# Patient Record
Sex: MALE | Race: WHITE | Employment: OTHER | URBAN - METROPOLITAN AREA
[De-identification: names, ages, dates, MRNs, and addresses within clinical notes are randomized per-mention and may not be internally consistent; named-entity substitution may affect disease eponyms.]

---

## 2018-05-16 ENCOUNTER — HOSPITAL ENCOUNTER (EMERGENCY)
Age: 78
Discharge: OTHER INSTITUTION | End: 2018-05-17
Attending: EMERGENCY MEDICINE
Payer: MEDICARE

## 2018-05-16 DIAGNOSIS — K52.9 GASTROENTERITIS: ICD-10-CM

## 2018-05-16 DIAGNOSIS — E86.0 DEHYDRATION: Primary | ICD-10-CM

## 2018-05-16 DIAGNOSIS — N18.9 CHRONIC RENAL FAILURE, UNSPECIFIED CKD STAGE: ICD-10-CM

## 2018-05-16 LAB
ALBUMIN SERPL-MCNC: 4 G/DL (ref 3.9–4.9)
ALP BLD-CCNC: 100 U/L (ref 35–104)
ALT SERPL-CCNC: 11 U/L (ref 0–41)
AMYLASE: 91 U/L (ref 28–100)
ANION GAP SERPL CALCULATED.3IONS-SCNC: 17 MEQ/L (ref 7–13)
AST SERPL-CCNC: 21 U/L (ref 0–40)
BASOPHILS ABSOLUTE: 0 K/UL (ref 0–0.2)
BASOPHILS RELATIVE PERCENT: 0.2 %
BILIRUB SERPL-MCNC: 0.8 MG/DL (ref 0–1.2)
BUN BLDV-MCNC: 38 MG/DL (ref 8–23)
CALCIUM SERPL-MCNC: 8.7 MG/DL (ref 8.6–10.2)
CHLORIDE BLD-SCNC: 94 MEQ/L (ref 98–107)
CO2: 28 MEQ/L (ref 22–29)
EOSINOPHILS ABSOLUTE: 0.1 K/UL (ref 0–0.7)
EOSINOPHILS RELATIVE PERCENT: 0.9 %
GLOBULIN: 4.5 G/DL (ref 2.3–3.5)
GLUCOSE BLD-MCNC: 142 MG/DL (ref 74–109)
HCT VFR BLD CALC: 29.5 % (ref 42–52)
HEMOGLOBIN: 10.3 G/DL (ref 14–18)
LACTIC ACID: 2.8 MMOL/L (ref 0.5–2.2)
LIPASE: 30 U/L (ref 13–60)
LYMPHOCYTES ABSOLUTE: 1 K/UL (ref 1–4.8)
LYMPHOCYTES RELATIVE PERCENT: 16 %
MCH RBC QN AUTO: 33 PG (ref 27–31.3)
MCHC RBC AUTO-ENTMCNC: 34.8 % (ref 33–37)
MCV RBC AUTO: 94.9 FL (ref 80–100)
MONOCYTES ABSOLUTE: 0.2 K/UL (ref 0.2–0.8)
MONOCYTES RELATIVE PERCENT: 3.8 %
NEUTROPHILS ABSOLUTE: 5 K/UL (ref 1.4–6.5)
NEUTROPHILS RELATIVE PERCENT: 79.1 %
PDW BLD-RTO: 18.2 % (ref 11.5–14.5)
PLATELET # BLD: 91 K/UL (ref 130–400)
POTASSIUM SERPL-SCNC: 4.7 MEQ/L (ref 3.5–5.1)
RBC # BLD: 3.11 M/UL (ref 4.7–6.1)
SODIUM BLD-SCNC: 139 MEQ/L (ref 132–144)
TOTAL PROTEIN: 8.5 G/DL (ref 6.4–8.1)
WBC # BLD: 6.4 K/UL (ref 4.8–10.8)

## 2018-05-16 PROCEDURE — 6360000002 HC RX W HCPCS: Performed by: EMERGENCY MEDICINE

## 2018-05-16 PROCEDURE — 83605 ASSAY OF LACTIC ACID: CPT

## 2018-05-16 PROCEDURE — 6370000000 HC RX 637 (ALT 250 FOR IP): Performed by: EMERGENCY MEDICINE

## 2018-05-16 PROCEDURE — 96375 TX/PRO/DX INJ NEW DRUG ADDON: CPT

## 2018-05-16 PROCEDURE — 80053 COMPREHEN METABOLIC PANEL: CPT

## 2018-05-16 PROCEDURE — 82150 ASSAY OF AMYLASE: CPT

## 2018-05-16 PROCEDURE — 85025 COMPLETE CBC W/AUTO DIFF WBC: CPT

## 2018-05-16 PROCEDURE — 96374 THER/PROPH/DIAG INJ IV PUSH: CPT

## 2018-05-16 PROCEDURE — 83690 ASSAY OF LIPASE: CPT

## 2018-05-16 PROCEDURE — 2580000003 HC RX 258: Performed by: EMERGENCY MEDICINE

## 2018-05-16 PROCEDURE — 99285 EMERGENCY DEPT VISIT HI MDM: CPT

## 2018-05-16 PROCEDURE — 87040 BLOOD CULTURE FOR BACTERIA: CPT

## 2018-05-16 PROCEDURE — 36415 COLL VENOUS BLD VENIPUNCTURE: CPT

## 2018-05-16 RX ORDER — METOCLOPRAMIDE HYDROCHLORIDE 5 MG/ML
10 INJECTION INTRAMUSCULAR; INTRAVENOUS ONCE
Status: COMPLETED | OUTPATIENT
Start: 2018-05-16 | End: 2018-05-16

## 2018-05-16 RX ORDER — ACETAMINOPHEN 500 MG
1000 TABLET ORAL ONCE
Status: COMPLETED | OUTPATIENT
Start: 2018-05-16 | End: 2018-05-16

## 2018-05-16 RX ORDER — SODIUM CHLORIDE 0.9 % (FLUSH) 0.9 %
3 SYRINGE (ML) INJECTION EVERY 8 HOURS
Status: DISCONTINUED | OUTPATIENT
Start: 2018-05-16 | End: 2018-05-17 | Stop reason: HOSPADM

## 2018-05-16 RX ORDER — DIPHENHYDRAMINE HYDROCHLORIDE 50 MG/ML
25 INJECTION INTRAMUSCULAR; INTRAVENOUS ONCE
Status: COMPLETED | OUTPATIENT
Start: 2018-05-16 | End: 2018-05-16

## 2018-05-16 RX ORDER — SODIUM CHLORIDE 9 MG/ML
INJECTION, SOLUTION INTRAVENOUS CONTINUOUS
Status: DISCONTINUED | OUTPATIENT
Start: 2018-05-16 | End: 2018-05-17 | Stop reason: HOSPADM

## 2018-05-16 RX ORDER — ONDANSETRON 2 MG/ML
4 INJECTION INTRAMUSCULAR; INTRAVENOUS ONCE
Status: COMPLETED | OUTPATIENT
Start: 2018-05-16 | End: 2018-05-16

## 2018-05-16 RX ADMIN — METOCLOPRAMIDE 10 MG: 5 INJECTION, SOLUTION INTRAMUSCULAR; INTRAVENOUS at 23:27

## 2018-05-16 RX ADMIN — Medication 3 ML: at 22:51

## 2018-05-16 RX ADMIN — ACETAMINOPHEN 1000 MG: 500 TABLET ORAL at 22:50

## 2018-05-16 RX ADMIN — ONDANSETRON 4 MG: 2 INJECTION INTRAMUSCULAR; INTRAVENOUS at 22:50

## 2018-05-16 RX ADMIN — DIPHENHYDRAMINE HYDROCHLORIDE 25 MG: 50 INJECTION INTRAMUSCULAR; INTRAVENOUS at 23:27

## 2018-05-16 ASSESSMENT — ENCOUNTER SYMPTOMS
FACIAL SWELLING: 0
VOICE CHANGE: 0
CHEST TIGHTNESS: 0
WHEEZING: 0
COUGH: 0
CONSTIPATION: 0
TROUBLE SWALLOWING: 0
SHORTNESS OF BREATH: 0
ABDOMINAL PAIN: 0
STRIDOR: 0
NAUSEA: 1
BACK PAIN: 0
EYE PAIN: 0
CHOKING: 0
EYE REDNESS: 0
EYE DISCHARGE: 0
SORE THROAT: 0
BLOOD IN STOOL: 0
DIARRHEA: 1
SINUS PRESSURE: 0
VOMITING: 0

## 2018-05-16 ASSESSMENT — PAIN SCALES - GENERAL: PAINLEVEL_OUTOF10: 0

## 2018-05-17 VITALS
RESPIRATION RATE: 16 BRPM | DIASTOLIC BLOOD PRESSURE: 62 MMHG | HEART RATE: 98 BPM | SYSTOLIC BLOOD PRESSURE: 140 MMHG | WEIGHT: 205 LBS | TEMPERATURE: 100.8 F | HEIGHT: 66 IN | OXYGEN SATURATION: 98 % | BODY MASS INDEX: 32.95 KG/M2

## 2018-05-17 PROBLEM — Z86.19 HISTORY OF CMV: Status: ACTIVE | Noted: 2018-01-21

## 2018-05-17 PROBLEM — N18.6 ESRD (END STAGE RENAL DISEASE) (HCC): Status: ACTIVE | Noted: 2018-03-22

## 2018-05-17 PROBLEM — Z51.81 ANTICOAGULATION MANAGEMENT ENCOUNTER: Status: ACTIVE | Noted: 2018-02-15

## 2018-05-17 PROBLEM — Z86.19 HISTORY OF VIRAL INFECTION: Status: ACTIVE | Noted: 2018-01-21

## 2018-05-17 PROBLEM — K52.9 COLITIS: Status: ACTIVE | Noted: 2018-01-21

## 2018-05-17 PROBLEM — I48.19 ATRIAL FIBRILLATION, PERSISTENT (HCC): Status: ACTIVE | Noted: 2018-02-18

## 2018-05-17 PROBLEM — I63.9 ISCHEMIC STROKE (HCC): Status: ACTIVE | Noted: 2018-02-17

## 2018-05-17 PROBLEM — Z92.25 PERSONAL HISTORY OF IMMUNOSUPRESSION THERAPY: Status: ACTIVE | Noted: 2018-01-21

## 2018-05-17 PROBLEM — Z79.01 ANTICOAGULATION MANAGEMENT ENCOUNTER: Status: ACTIVE | Noted: 2018-02-15

## 2018-05-17 RX ORDER — KETOTIFEN FUMARATE 0.35 MG/ML
SOLUTION/ DROPS OPHTHALMIC
COMMUNITY
Start: 2018-02-15

## 2018-05-17 RX ORDER — LIDOCAINE HCL 4% 4 G/100G
CREAM TOPICAL
COMMUNITY
Start: 2018-04-19

## 2018-05-17 RX ORDER — FOLIC ACID/VIT B COMPLEX AND C 0.8 MG
1 TABLET ORAL
COMMUNITY
Start: 2018-02-16

## 2018-05-17 RX ORDER — PSEUDOEPHEDRINE HCL 30 MG
100 TABLET ORAL
COMMUNITY
Start: 2018-02-15

## 2018-05-17 RX ORDER — GENTAMICIN SULFATE 1 MG/G
CREAM TOPICAL
COMMUNITY
Start: 2018-02-15

## 2018-05-17 RX ORDER — SEVELAMER CARBONATE 800 MG/1
800 TABLET, FILM COATED ORAL
COMMUNITY
Start: 2018-02-15

## 2018-05-17 RX ORDER — BISACODYL 10 MG
10 SUPPOSITORY, RECTAL RECTAL
COMMUNITY
Start: 2018-02-15

## 2018-05-17 RX ORDER — DEXTROSE MONOHYDRATE 25 G/50ML
25 INJECTION, SOLUTION INTRAVENOUS
COMMUNITY
Start: 2018-02-15

## 2018-05-17 RX ORDER — TACROLIMUS 0.5 MG/1
1.5 CAPSULE ORAL
COMMUNITY
Start: 2018-02-22 | End: 2019-02-22

## 2018-05-17 RX ORDER — METOPROLOL SUCCINATE 25 MG/1
50 TABLET, EXTENDED RELEASE ORAL
COMMUNITY

## 2018-05-17 RX ORDER — METOPROLOL SUCCINATE 25 MG/1
25 TABLET, EXTENDED RELEASE ORAL
COMMUNITY
Start: 2018-03-30

## 2018-05-17 RX ORDER — SULFAMETHOXAZOLE AND TRIMETHOPRIM 400; 80 MG/1; MG/1
1 TABLET ORAL
COMMUNITY
Start: 2018-02-23 | End: 2018-08-22

## 2018-05-17 RX ORDER — DEXTROSE AND SODIUM CHLORIDE 5; .45 G/100ML; G/100ML
50 INJECTION, SOLUTION INTRAVENOUS
COMMUNITY
Start: 2018-02-15

## 2018-05-17 RX ORDER — ATORVASTATIN CALCIUM 10 MG/1
10 TABLET, FILM COATED ORAL
COMMUNITY

## 2018-05-17 RX ORDER — NICOTINE POLACRILEX 4 MG
15 LOZENGE BUCCAL
COMMUNITY
Start: 2018-02-15

## 2018-05-17 RX ORDER — ACETAMINOPHEN 325 MG/1
650 TABLET ORAL
COMMUNITY
Start: 2018-02-15

## 2018-05-17 RX ORDER — MENTHOL AND ZINC OXIDE .44; 20.625 G/100G; G/100G
OINTMENT TOPICAL
COMMUNITY
Start: 2018-02-15

## 2018-05-17 RX ORDER — SULFAMETHOXAZOLE AND TRIMETHOPRIM 800; 160 MG/1; MG/1
TABLET ORAL
COMMUNITY

## 2018-05-17 RX ORDER — LIDOCAINE 50 MG/G
PATCH TOPICAL
COMMUNITY
Start: 2018-04-19

## 2018-05-17 ASSESSMENT — ENCOUNTER SYMPTOMS
VOMITING: 0
TROUBLE SWALLOWING: 0
STRIDOR: 0
EYE PAIN: 0
CHOKING: 0
NAUSEA: 1
EYE DISCHARGE: 0
EYE REDNESS: 0
SORE THROAT: 0
VOICE CHANGE: 0
WHEEZING: 0
ABDOMINAL PAIN: 0
COUGH: 0
CONSTIPATION: 0
BACK PAIN: 0
CHEST TIGHTNESS: 0
FACIAL SWELLING: 0
SHORTNESS OF BREATH: 0
DIARRHEA: 1
BLOOD IN STOOL: 0
SINUS PRESSURE: 0

## 2018-05-19 LAB
BLOOD CULTURE, ROUTINE: ABNORMAL
BLOOD CULTURE, ROUTINE: ABNORMAL
ORGANISM: ABNORMAL

## 2018-05-22 LAB — CULTURE, BLOOD 2: NORMAL

## 2022-11-14 ENCOUNTER — HOSPITAL ENCOUNTER (EMERGENCY)
Age: 82
Discharge: HOME | End: 2022-11-14
Attending: EMERGENCY MEDICINE
Payer: MEDICARE

## 2022-11-14 VITALS
BODY MASS INDEX: 27.97 KG/M2 | SYSTOLIC BLOOD PRESSURE: 107 MMHG | DIASTOLIC BLOOD PRESSURE: 59 MMHG | HEART RATE: 89 BPM | WEIGHT: 174 LBS | OXYGEN SATURATION: 98 % | HEIGHT: 66 IN | RESPIRATION RATE: 20 BRPM | TEMPERATURE: 97.7 F

## 2022-11-14 DIAGNOSIS — T82.838A BLEEDING FROM DIALYSIS SHUNT, INITIAL ENCOUNTER (HCC): Primary | ICD-10-CM

## 2022-11-14 PROCEDURE — 99282 EMERGENCY DEPT VISIT SF MDM: CPT

## 2022-11-14 RX ORDER — MIDODRINE HYDROCHLORIDE 5 MG/1
15 TABLET ORAL 3 TIMES DAILY
COMMUNITY

## 2022-11-14 ASSESSMENT — PAIN - FUNCTIONAL ASSESSMENT: PAIN_FUNCTIONAL_ASSESSMENT: NONE - DENIES PAIN

## 2022-11-15 NOTE — ED NOTES
Pt bleeding at site has resolved after Dr. Audi Hodges used dermabond. Pt's fistula dressed and states he is comfortable with discharge.      Farhat Rodriguez RN  11/14/22 1943

## 2022-11-15 NOTE — ED PROVIDER NOTES
[facility] ED  EMERGENCY DEPARTMENT ENCOUNTER      Pt Name: Mara Banks  MRN: [id number]  [place] 1940  Date of evaluation: 11/14/2022  Provider: Miguel Garcia DO    CHIEF COMPLAINT       Chief Complaint   Patient presents with    Vascular Access Problem     Bleeding at left upper arm fistula site since dialysis today     Chief complaint: Bleeding from my dialysis site  History of chief complaint: This 56-year-old gentleman presents the emergency department complaining of oozing blood from his dialysis site that began shortly after dialysis this afternoon. Patient states he has had a little oozing after dialysis before can usually get it controlled with pressure but continues to ooze today. Denies any use of blood thinners. Patient states he is otherwise been well denies any numb tingling weakness to the extremity no injury or trauma. No other complaint. Nursing Notes were reviewed. REVIEW OF SYSTEMS    (2-9 systems for level 4, 10 or more for level 5)     Review of Systems  Pertinent findings documented in the history of present illness  Except as noted above the remainder of the review of systems was reviewed and negative.        PAST MEDICAL HISTORY     Past Medical History:   Diagnosis Date    Renal failure          SURGICAL HISTORY       Past Surgical History:   Procedure Laterality Date    LIVER TRANSPLANT           CURRENT MEDICATIONS       Previous Medications    ACETAMINOPHEN (TYLENOL) 325 MG TABLET    Take 650 mg by mouth    APAP-CALCIUM CARBONATE 500-250 MG TABS    Take 500 mg by mouth    ATORVASTATIN (LIPITOR) 10 MG TABLET    Take 10 mg by mouth    B COMPLEX-C-FOLIC ACID (ERICA-FILIPPO) TABS    Take 1 each by mouth    BISACODYL (DULCOLAX) 10 MG SUPPOSITORY    Place 10 mg rectally    DARBEPOETIN ELMER-POLYSORBATE (ARANESP) 100 MCG/0.5ML SOSY INJECTION    Inject 100 mcg into the skin    DEXTROSE 50 % SOLUTION    Infuse 25 g intravenously    DEXTROSE-SODIUM CHLORIDE (DEXTROSE 5 % AND 0.45 % NACL) 5-0.45 % INFUSION    Infuse 50 mL/hr intravenously    DILTIAZEM (CARDIZEM) 30 MG TABLET    Take 30 mg by mouth    DOCUSATE (COLACE, DULCOLAX) 100 MG CAPS    Take 100 mg by mouth    GENTAMICIN (GARAMYCIN) 0.1 % CREAM    Apply topically    GLUCAGON 1 MG INJECTION    Inject 1 mg into the muscle    GLUCOSE (GLUTOSE) 40 % GEL    Take 15 g by mouth    KETOTIFEN (ZADITOR) 0.025 % OPHTHALMIC SOLUTION    Administer 1 drop into the left eye 2 (two) times a day. LIDOCAINE (LIDODERM) 5 %    Apply patch to affected area as needed (12 hr on and 12h off)    LIDOCAINE HCL 4 % CREA    Apply to affected area as needed at night time, may use up to 3 times a day    MENTHOL-ZINC OXIDE (CALMOSEPTINE) 0.44-20.625 % OINT OINTMENT    Apply topically    METOPROLOL SUCCINATE (TOPROL XL) 25 MG EXTENDED RELEASE TABLET    Take 50 mg by mouth    METOPROLOL SUCCINATE (TOPROL XL) 25 MG EXTENDED RELEASE TABLET    Take 25 mg by mouth    MIDODRINE (PROAMATINE) 5 MG TABLET    Take 15 mg by mouth 3 times daily    SEVELAMER (RENVELA) 800 MG TABLET    Take 800 mg by mouth    SULFAMETHOXAZOLE-TRIMETHOPRIM (BACTRIM DS;SEPTRA DS) 800-160 MG PER TABLET    Take by mouth    TACROLIMUS (PROGRAF) 0.5 MG CAPSULE    Take 1.5 mg by mouth       ALLERGIES     Latex and Penicillins    FAMILY HISTORY     History reviewed. No pertinent family history.        SOCIAL HISTORY       Social History     Socioeconomic History    Marital status:      Spouse name: None    Number of children: None    Years of education: None    Highest education level: None   Tobacco Use    Smoking status: Never     Passive exposure: Never    Smokeless tobacco: Never   Vaping Use    Vaping Use: Never used   Substance and Sexual Activity    Alcohol use: Never    Drug use: Never    Sexual activity: Not Currently     Partners: Female         PHYSICAL EXAM    (up to 7 for level 4, 8 or more for level 5)     ED Triage Vitals   BP Temp Temp Source Heart Rate Resp SpO2 Height Weight 11/14/22 1801 11/14/22 1747 11/14/22 1747 11/14/22 1747 11/14/22 1747 11/14/22 1747 11/14/22 1747 11/14/22 1747   (!) 114/58 97.7 °F (36.5 °C) Oral 92 20 92 % 5' 6\" (1.676 m) 174 lb (78.9 kg)       Physical Exam  General appearance: Patient is awake alert interactive appropriate nontoxic in no distress, pleasant and joking  Eyes pupils are equal and reactive sclera white conjunctive are pink   oral pharyngeal cavity is pink with good moisture  Heart is regular rate and rhythm with intermittent ectopy  Lungs clear to auscultation no active wheezes rales or rhonchi. No respiratory distress. Pulse ox 100% on 1 L home O2  Left arm: There is a puncture site overlying the left arm fistula with point of oozing clears with dabbing with a 4 x 4. There is a good palpable pulse no erythema warmth or discoloration to the extremity. EMERGENCY DEPARTMENT COURSE and DIFFERENTIAL DIAGNOSIS/MDM:   Vitals:    Vitals:    11/14/22 1747 11/14/22 1801 11/14/22 1930 11/14/22 1931   BP:  (!) 114/58 (!) 107/59    Pulse: 92 89     Resp: 20 20     Temp: 97.7 °F (36.5 °C)      TempSrc: Oral      SpO2: 92% 92% 100% 100%   Weight: 174 lb (78.9 kg)      Height: 5' 6\" (1.676 m)        Treatment and course: Wound care. Area was wrapped with a pressure dressing prior to arrival there was a small persistent ooze from the area on examination. The area was cleansed and skin adhesive was used with resolution of bleeding. Dressing was applied    FINAL IMPRESSION      1. Bleeding from dialysis shunt, initial encounter [facility])          DISPOSITION/PLAN   DISPOSITION    Patient discharged home advised to leave the dressing in place this evening. May remove tomorrow for recheck. Patient to return if any change or worsening any recurrent bleeding pain discoloration.   Patient to follow-up with primary physician/dialysis physician for repeat assessment in the next 2 days    PATIENT REFERRED TO:  Your doctor  for site recheck  In 2 days      DISCHARGE MEDICATIONS:  New Prescriptions    No medications on file     Controlled Substances Monitoring:     No flowsheet data found.     (Please note that portions of this note were completed with a voice recognition program.  Efforts were made to edit the dictations but occasionally words are mis-transcribed.)    Zhanna Navarrete DO (electronically signed)  Attending Emergency Physician            Zhanna Navarrete DO  11/14/22 [id number]

## 2023-12-01 ENCOUNTER — HOSPITAL ENCOUNTER (EMERGENCY)
Facility: HOSPITAL | Age: 83
Discharge: HOME | End: 2023-12-01
Attending: STUDENT IN AN ORGANIZED HEALTH CARE EDUCATION/TRAINING PROGRAM
Payer: MEDICARE

## 2023-12-01 VITALS
BODY MASS INDEX: 25.23 KG/M2 | OXYGEN SATURATION: 100 % | SYSTOLIC BLOOD PRESSURE: 139 MMHG | WEIGHT: 157 LBS | HEART RATE: 82 BPM | RESPIRATION RATE: 18 BRPM | DIASTOLIC BLOOD PRESSURE: 70 MMHG | HEIGHT: 66 IN | TEMPERATURE: 98.2 F

## 2023-12-01 DIAGNOSIS — T82.9XXA DIALYSIS COMPLICATION, INITIAL ENCOUNTER: Primary | ICD-10-CM

## 2023-12-01 LAB
ALBUMIN SERPL BCP-MCNC: 4 G/DL (ref 3.4–5)
ALP SERPL-CCNC: 94 U/L (ref 33–136)
ALT SERPL W P-5'-P-CCNC: 17 U/L (ref 10–52)
ANION GAP SERPL CALC-SCNC: 12 MMOL/L (ref 10–20)
AST SERPL W P-5'-P-CCNC: 31 U/L (ref 9–39)
BASOPHILS # BLD MANUAL: 0 X10*3/UL (ref 0–0.1)
BASOPHILS NFR BLD MANUAL: 0 %
BILIRUB SERPL-MCNC: 1.8 MG/DL (ref 0–1.2)
BUN SERPL-MCNC: 15 MG/DL (ref 6–23)
CALCIUM SERPL-MCNC: 9.4 MG/DL (ref 8.6–10.3)
CHLORIDE SERPL-SCNC: 95 MMOL/L (ref 98–107)
CO2 SERPL-SCNC: 33 MMOL/L (ref 21–32)
CREAT SERPL-MCNC: 3.1 MG/DL (ref 0.5–1.3)
EOSINOPHIL # BLD MANUAL: 0 X10*3/UL (ref 0–0.4)
EOSINOPHIL NFR BLD MANUAL: 0 %
ERYTHROCYTE [DISTWIDTH] IN BLOOD BY AUTOMATED COUNT: 17.5 % (ref 11.5–14.5)
GFR SERPL CREATININE-BSD FRML MDRD: 19 ML/MIN/1.73M*2
GLUCOSE SERPL-MCNC: 138 MG/DL (ref 74–99)
HCT VFR BLD AUTO: 25.9 % (ref 41–52)
HGB BLD-MCNC: 8 G/DL (ref 13.5–17.5)
HYPOCHROMIA BLD QL SMEAR: ABNORMAL
IMM GRANULOCYTES # BLD AUTO: 0.05 X10*3/UL (ref 0–0.5)
IMM GRANULOCYTES NFR BLD AUTO: 1.4 % (ref 0–0.9)
LYMPHOCYTES # BLD MANUAL: 0.7 X10*3/UL (ref 0.8–3)
LYMPHOCYTES NFR BLD MANUAL: 20 %
MCH RBC QN AUTO: 28.5 PG (ref 26–34)
MCHC RBC AUTO-ENTMCNC: 30.9 G/DL (ref 32–36)
MCV RBC AUTO: 92 FL (ref 80–100)
METAMYELOCYTES # BLD MANUAL: 0.07 X10*3/UL
METAMYELOCYTES NFR BLD MANUAL: 2 %
MONOCYTES # BLD MANUAL: 0.25 X10*3/UL (ref 0.05–0.8)
MONOCYTES NFR BLD MANUAL: 7 %
NEUTROPHILS # BLD MANUAL: 2.49 X10*3/UL (ref 1.6–5.5)
NEUTS BAND # BLD MANUAL: 0.07 X10*3/UL (ref 0–0.5)
NEUTS BAND NFR BLD MANUAL: 2 %
NEUTS SEG # BLD MANUAL: 2.42 X10*3/UL (ref 1.6–5)
NEUTS SEG NFR BLD MANUAL: 69 %
NRBC BLD-RTO: 0.6 /100 WBCS (ref 0–0)
PLATELET # BLD AUTO: 68 X10*3/UL (ref 150–450)
POLYCHROMASIA BLD QL SMEAR: ABNORMAL
POTASSIUM SERPL-SCNC: 3.5 MMOL/L (ref 3.5–5.3)
PROT SERPL-MCNC: 10.1 G/DL (ref 6.4–8.2)
RBC # BLD AUTO: 2.81 X10*6/UL (ref 4.5–5.9)
RBC MORPH BLD: ABNORMAL
SODIUM SERPL-SCNC: 136 MMOL/L (ref 136–145)
TOTAL CELLS COUNTED BLD: 100
WBC # BLD AUTO: 3.5 X10*3/UL (ref 4.4–11.3)

## 2023-12-01 PROCEDURE — 99283 EMERGENCY DEPT VISIT LOW MDM: CPT

## 2023-12-01 PROCEDURE — 80053 COMPREHEN METABOLIC PANEL: CPT | Performed by: STUDENT IN AN ORGANIZED HEALTH CARE EDUCATION/TRAINING PROGRAM

## 2023-12-01 PROCEDURE — 85027 COMPLETE CBC AUTOMATED: CPT | Performed by: STUDENT IN AN ORGANIZED HEALTH CARE EDUCATION/TRAINING PROGRAM

## 2023-12-01 PROCEDURE — 85007 BL SMEAR W/DIFF WBC COUNT: CPT | Performed by: STUDENT IN AN ORGANIZED HEALTH CARE EDUCATION/TRAINING PROGRAM

## 2023-12-01 PROCEDURE — 99284 EMERGENCY DEPT VISIT MOD MDM: CPT | Performed by: STUDENT IN AN ORGANIZED HEALTH CARE EDUCATION/TRAINING PROGRAM

## 2023-12-01 PROCEDURE — 2500000005 HC RX 250 GENERAL PHARMACY W/O HCPCS: Performed by: STUDENT IN AN ORGANIZED HEALTH CARE EDUCATION/TRAINING PROGRAM

## 2023-12-01 PROCEDURE — 36415 COLL VENOUS BLD VENIPUNCTURE: CPT | Performed by: STUDENT IN AN ORGANIZED HEALTH CARE EDUCATION/TRAINING PROGRAM

## 2023-12-01 PROCEDURE — 94760 N-INVAS EAR/PLS OXIMETRY 1: CPT

## 2023-12-01 RX ORDER — TRANEXAMIC ACID 100 MG/ML
500 INJECTION, SOLUTION INTRAVENOUS ONCE
Status: COMPLETED | OUTPATIENT
Start: 2023-12-01 | End: 2023-12-01

## 2023-12-01 RX ADMIN — TRANEXAMIC ACID 500 MG: 100 INJECTION, SOLUTION INTRAVENOUS at 19:06

## 2023-12-01 ASSESSMENT — COLUMBIA-SUICIDE SEVERITY RATING SCALE - C-SSRS
2. HAVE YOU ACTUALLY HAD ANY THOUGHTS OF KILLING YOURSELF?: NO
1. IN THE PAST MONTH, HAVE YOU WISHED YOU WERE DEAD OR WISHED YOU COULD GO TO SLEEP AND NOT WAKE UP?: NO
6. HAVE YOU EVER DONE ANYTHING, STARTED TO DO ANYTHING, OR PREPARED TO DO ANYTHING TO END YOUR LIFE?: NO

## 2023-12-01 ASSESSMENT — PAIN SCALES - GENERAL: PAINLEVEL_OUTOF10: 0 - NO PAIN

## 2023-12-01 ASSESSMENT — LIFESTYLE VARIABLES
HAVE YOU EVER FELT YOU SHOULD CUT DOWN ON YOUR DRINKING: NO
HAVE PEOPLE ANNOYED YOU BY CRITICIZING YOUR DRINKING: NO
EVER HAD A DRINK FIRST THING IN THE MORNING TO STEADY YOUR NERVES TO GET RID OF A HANGOVER: NO
EVER FELT BAD OR GUILTY ABOUT YOUR DRINKING: NO
REASON UNABLE TO ASSESS: YES

## 2023-12-01 ASSESSMENT — PAIN - FUNCTIONAL ASSESSMENT: PAIN_FUNCTIONAL_ASSESSMENT: 0-10

## 2023-12-01 NOTE — ED TRIAGE NOTES
PT. ARRIVED VIA EMS, TO ED FROM HOME FOR VASCULAR ACCESS PROBLEM. PT. STATES HE HAS FISTULA ON LT UPPER ARM, HAS DIALYSIS ON M/W/F, HAD FULL SESSION TODAY. PT. STATES AROUND 1530 FISTULA STARTED BLEEDING, TRIED TO CONTROL BLEEDING AT HOME AND WAS UNSUCCESSFUL. PT. CALLED EMS AND EMS PLACED TOURNIQUET ON AT 1655, WAS REMOVED UPON ARRIVAL TO ED. PT. DENIES CP, SOB, N/V/D, HA, NUMBNESS/TINGLING, DIZZINESS/LIGHTHEADEDNESS.

## 2023-12-01 NOTE — ED PROVIDER NOTES
HPI   Chief Complaint   Patient presents with    Vascular Access Problem       Patient is a 83-year-old male with history of ESRD on dialysis Monday Wednesday Friday, full session today, thrombocytopenia, anemia, remote liver cancer, factor V Leiden who presents for bleeding.  Patient was at dialysis and was finishing up when he started bleeding after they removed the needle, this was approximately 1530.  EMS applied a tourniquet to the fistula for approximate hour prior to arrival.  This was removed on arrival.  No lightheadedness, dizziness, chest pain, shortness of breath.                          Collin Coma Scale Score: 15                  Patient History   No past medical history on file.  No past surgical history on file.  No family history on file.  Social History     Tobacco Use    Smoking status: Not on file    Smokeless tobacco: Not on file   Substance Use Topics    Alcohol use: Not on file    Drug use: Not on file       Physical Exam   ED Triage Vitals [12/01/23 1739]   Temp Heart Rate Resp BP   36.8 °C (98.2 °F) 73 18 144/66      SpO2 Temp Source Heart Rate Source Patient Position   100 % Temporal Monitor Sitting      BP Location FiO2 (%)     Right arm --       Physical Exam  Constitutional:       General: He is not in acute distress.  HENT:      Head: Normocephalic.   Eyes:      Extraocular Movements: Extraocular movements intact.      Conjunctiva/sclera: Conjunctivae normal.      Pupils: Pupils are equal, round, and reactive to light.   Cardiovascular:      Rate and Rhythm: Normal rate and regular rhythm.      Pulses: Normal pulses.      Heart sounds: Normal heart sounds.   Pulmonary:      Effort: Pulmonary effort is normal.      Breath sounds: Normal breath sounds.   Abdominal:      General: There is no distension.      Palpations: Abdomen is soft. There is no mass.      Tenderness: There is no abdominal tenderness. There is no guarding.   Musculoskeletal:         General: No deformity.       Cervical back: Normal range of motion and neck supple.      Right lower leg: No edema.      Left lower leg: No edema.      Comments: Brisk bleeding from punctate wound to left upper arm inferior to fistula.  Fistula with palpable thrill.  2+ radial pulse in left arm.     Skin:     General: Skin is warm and dry.      Findings: No lesion or rash.   Neurological:      General: No focal deficit present.      Mental Status: He is alert and oriented to person, place, and time. Mental status is at baseline.      Cranial Nerves: No cranial nerve deficit.      Sensory: No sensory deficit.      Motor: No weakness.      Comments: Intact strength and sensation in left arm.   Psychiatric:         Mood and Affect: Mood normal.         ED Course & MDM   Diagnoses as of 12/02/23 0012   Dialysis complication, initial encounter       Medical Decision Making  Patient is an 83-year-old male with above-stated past medical history presents for bleeding.  Patient's fistula has a palpable thrill noted he has a 2+ radial pulse in his left arm, he has intact strength and sensation.  I have low suspicion for a DVT.  A point compression with gauze was applied for approximately 20 to 30 minutes and on reevaluation and bleeding has significantly lessened and was nearly stopped.  A patch of Surgicel soaked with TXA was applied with additional 20 to 30 minutes of pressure.  After application of each pressure dressing, patient's radial pulse was assessed and was 2+.  On reevaluation after the second pressure dressing, the bleeding has stopped.  Patient is quite well-appearing nontoxic.  Patient's laboratory work is appropriate for his ESRD status.  He states that his hematologist does not transfuse him unless he drops below 8.  Patient has no symptoms of anemia.  He feels comfortable returning home.  Patient was discharged home with return precautions and follow-up instructions.    Disclaimer: This note was dictated using speech recognition  software. Minor errors in transcription may be present. Please call if questions.     Jonathan Valente MD  [facility] Emergency Medicine  Contact on [employer]        Problems Addressed:  Dialysis complication, initial encounter: acute illness or injury    Amount and/or Complexity of Data Reviewed  Labs: ordered.        Procedure  Procedures     Jonathan Valente MD  12/02/23 0018